# Patient Record
Sex: FEMALE | Race: BLACK OR AFRICAN AMERICAN | URBAN - METROPOLITAN AREA
[De-identification: names, ages, dates, MRNs, and addresses within clinical notes are randomized per-mention and may not be internally consistent; named-entity substitution may affect disease eponyms.]

---

## 2022-02-25 NOTE — PROGRESS NOTES
the past for her chronic back pain. Orders:  -     C-Reactive Protein; Future  -     Sedimentation Rate; Future  -     HLA-B27 Antigen; Future  -     XR LUMBAR SPINE (2-3 VIEWS); Future  -     XR HIP BILATERAL W AP PELVIS (2 VIEWS); Future  3. Fibromyalgia  Assessment & Plan:  - discussed the Dx, pathophysiology and management options of fibromyalgia w/ the pt in detail. Discussed that fibromyalgia is a non-life threatening and non-rheumatic disease. Discussed the various Tx options  including the medical management and PT/aquatic therapy as well as self exercises. Discussed various FDA approved (Cymbalta, Lyrica, Gabapentin, Savella) and non-FDA approved medications (muscle relaxants, SSRI's) w/ the pt. Provided detailed handout on fibromyalgia to the pt. 4. Microcytic anemia  Assessment & Plan:  - pt reported recent onset heavy menstrual bleeding. Following w/ Gynecology. - repeat CBC and check GAVI. ROS negative for CTD. Orders:  -     CBC with Auto Differential; Future  -     GAVI Reflex to Antibody Cascade; Future  -     C3 Complement; Future  -     C4 Complement; Future  5. Encounter for therapeutic drug monitoring  Assessment & Plan:  - repeat renal function for chronic NSAID use. Orders:  -     Creatinine; Future  -     AST; Future  -     ALT; Future     Return in about 6 weeks (around 4/12/2022) for lab result discussion and treatment plan, medication monitoring. 3/1/2022 1:46 PM      The risks and benefits of my recommendations, as well as other treatment options, benefits and side effects were discussed w/ the pt today. Questions were answered. NOTE: This report is transcribed by using voice recognition software dragon. Every effort is made to ensure accuracy; however, inadvertent computerized transcription errors may be present. Consult note will be sent to the referring provider. Thank you very much for allowing me to participate in this pt's care.   Please do not hesitate to contact me if I can be of further assistance. HISTORY OF PRESENT ILLNESS:      Pt reports chronic pain in her neck and lower back since age 18. Back pain is exacerbated by doing housework such as laundry or sweeping and prolonged standing or sitting. Neck and low back feel stiff in the morning, stiffness is exacerbated by moving. Wrists \"pop\" occasionally. Legs and hip make \"a popping noise\" when she walks. No Hx of enthesitis. She reports some pain relief from Ibuprofen, Tramadol and Robaxin prescribed by her pain physician. Pt states she has tried steroid injections to her neck and back which provided temporary pain relief. She tells me she has also tried PT. She reports dry patches of skin otherwise no Hx of PsO. No Hx of uveitis or IBD. Denies known FHx of autoimmune disease. She is a former smoker. 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     REVIEW OF SYSTEMS:    Constitutional: denies chronic fatigue, fever/chills, night sweats, unintentional weight loss  Integumentary: denies photosensitivity, rash, patchy alopecia, or Sx of Raynaud's phenomenon  Eyes: denies dry eyes, redness or pain, visual disturbance, or floaters  Nose: denies nasal ulcers or recurrent sinusitis  Oral cavity: denies dry mouth, or oral ulcers  Cardiovascular: denies CP, palpitations, Hx of pericardial effusion or pericarditis  Respiratory: denies SOB, cough, hemoptysis, or pleurisy  Gastrointestinal: denies heart burn, dysphagia or esophageal dysmotility, denies change in bowel habits or Sx of IBD  Hematologic/Lymphatic: +recent heavy menstrual bleeding, denies abnormal bruising or bleeding, denies Hx of blood clots or recurrent miscarriages, denies swollen LNs  Musculoskeletal:  refer to above HPI   Neurological: denies focal weakness, paresthesias/hyperesthesias or change in sensation, denies Hx of seizure, denies change in gait, balance, or memory  Psychiatric: +well controlled depression and anxiety not currently taking any medicine    No past medical history on file. No past surgical history on file. Social History     Socioeconomic History    Marital status: Not on file     Spouse name: Not on file    Number of children: Not on file    Years of education: Not on file    Highest education level: Not on file   Occupational History    Not on file   Tobacco Use    Smoking status: Former Smoker     Quit date: 2020     Years since quittin.2    Smokeless tobacco: Never Used   Substance and Sexual Activity    Alcohol use: Not on file    Drug use: Not on file    Sexual activity: Not on file   Other Topics Concern    Not on file   Social History Narrative    Not on file     Social Determinants of Health     Financial Resource Strain:     Difficulty of Paying Living Expenses: Not on file   Food Insecurity:     Worried About [address] in the Last Year: Not on file    [address] [place] in the Last Year: Not on file   Transportation Needs:     Lack of Transportation (Medical): Not on file    Lack of Transportation (Non-Medical):  Not on file   Physical Activity:     Days of Exercise per Week: Not on file    Minutes of Exercise per Session: Not on file   Stress:     Feeling of Stress : Not on file   Social Connections:     Frequency of Communication with Friends and Family: Not on file    Frequency of Social Gatherings with Friends and Family: Not on file    Attends Taoist Services: Not on file    Active Member of [address] [place] [address] or Organizations: Not on file    Attends Club or Organization Meetings: Not on file    Marital Status: Not on file   Intimate Partner Violence:     Fear of Current or Ex-Partner: Not on file    Emotionally Abused: Not on file    Physically Abused: Not on file    Sexually Abused: Not on file   Housing Stability:     Unable to Pay for Housing in the Last Year: Not on file    Number of [place] in the Last Year: Not on file    Unstable Housing in the Last Year: Not on file        No family history on file.     MEDICATIONS:    Current Outpatient Medications:     ibuprofen (ADVIL;MOTRIN) 800 MG tablet, TAKE 1 TABLET BY MOUTH EVERY 8 HOURS AS NEEDED FOR 30 DAYS, Disp: , Rfl:     traMADol (ULTRAM) 50 MG tablet, TAKE 1 TABLET BY MOUTH EVERY 12 HOURS AS NEEDED FOR 30 DAYS, Disp: , Rfl:     methocarbamol (ROBAXIN) 750 MG tablet, TAKE 1 TABLET BY MOUTH EVERY 8 HOURS AS NEEDED FOR 30 DAYS, Disp: , Rfl:     estrogens, conjugated, (PREMARIN) 1.25 MG tablet, Take 1.25 mg by mouth daily, Disp: , Rfl:     acetaminophen (TYLENOL) 500 MG tablet, Take 500 mg by mouth every 6 hours as needed, Disp: , Rfl:     medical marijuana, , Disp: , Rfl:     ALLERGIES:  Cat hair extract and Penicillins    PHYSICAL EXAM:    Vitals:    /76   Pulse 77   Ht 4' 11\" (1.499 m)   Wt 210 lb (95.3 kg)   SpO2 99%   BMI 42.41 kg/m²     GEN: AAOx3, in NAD, well-appearing, elevated BMI  HEAD: normocephalic, atraumatic  EYES: no injection or icterus  NOSE: no nasal ulcers or nasal drainage  ORAL CAVITY: moist oral mucosa w/ good saliva pooling, no oral lesions, no evidence of thrush, no evidence of parotid gland enlargement  CVS: RRR  LUNGS: in no acute respiratory distress, CTAB  MSK: there is diffuse myofascial tenderness w/ 18/18 tender points  Spine: no kyphosis or lordosis, +myofascial tenderness marco in the trapezius and lumba regions,  SI joints TTP  Upper extremities:   Hands: no synovitis in the MCP, PIP, or DIP joints b/l, no dactylitis, able to make strong full fists, no evidence of sclerodactyly, calcinosis, digital ulcers or pitting   Wrist: no synovitis in the wrist joints b/l, FROM   Elbow: no synovitis or bursitis, FROM  Lower extremities:   Knees: no warmth or effusion present, FROM   Ankles: no synovitis, FROM, Achilles tendons w/o swelling or warmth NTTP   Feet: no toe swelling or pain or warmth on palpation w/ FROM, negative MTP squeeze test  INTEGUMENT: no rash or psoriatic lesions, no petechiae, bruises, or palpable purpura, no patchy alopecia, no nail pitting or periungual changes, no clubbing or digital ulcers    Labs: I personally reviewed prior labs including:    CBC w/ diff (6/17/21): WBC wnl, H/H 10.2/31.9, MCV 79.6, plt count 290  Scr 0.9, GFR 87 (6/17/21)    Radiology:  I personally reviewed prior imaging including:    MRI C-spine (10/28/20): FINDINGS:   MRI CERVICAL SPINE:   The cervical lordosis is straightened. The vertebral body heights are normal. The marrow signal intensity is unremarkable. The atlantoaxial joint is within normal limits. Small marginal endplate osteophytes are seen. The cervical spinal cord is normal in size and signal intensity. No paravertebral soft tissue abnormality is seen. C2-3: The disc height is normal. No significant disc bulge or central canal or foraminal stenosis is seen. C3-4: The disc height is normal. No significant disc bulge or central canal or foraminal stenosis is seen. C4-5: The disc height is normal. Mild bulging of the posterior annulus is seen. No significant central canal or foraminal stenosis is seen. C5-6: The disc height is normal. Minimal bulging of the posterior annulus is seen. No significant central canal or foraminal stenosis is seen. C6-7: The disc height is normal. Minimal bulging of the posterior annulus is seen. No significant central canal or foraminal stenosis is seen. C7-T1: The disc height is normal. No significant disc bulge or central canal or foraminal stenosis is seen. IMPRESSION:   1. Minimal degenerative spondylosis of of the cervical spine. 2.  Straightening of the cervical lordosis. 3.  Otherwise, the remainder of the examination is unremarkable. MRI L-spine (10/27/20):  MRI  LUMBAR SPINE:   The lumbar lordosis is maintained. The distal cord and conus are normal in appearance without demyelination, infiltration, contusion or syrinx and terminates at T12-L1. The vertebral body heights are preserved.  No pars defects are present. The marrow signal intensity is unremarkable. T12-L1: The disc is normal in height is normal. No significant disc bulge or protrusion is seen. No significant central canal or foraminal stenosis is seen. L1-L2: The disc is normal in height is normal. No significant disc bulge or protrusion is seen. No significant central canal or foraminal stenosis is seen. L2-L3: The disc is normal in height is normal. No significant disc bulge or protrusion is seen. No significant central canal or foraminal stenosis is seen. L3-L4: The disc is normal in height is normal. No significant disc bulge or protrusion is seen. No significant central canal or foraminal stenosis is seen. L4-L5: The disc is normal in height is normal. No significant disc bulge or protrusion is seen. No significant central canal or foraminal stenosis is seen. L5-S1: The disc is normal in height is normal. No significant disc bulge or protrusion is seen. No significant central canal or foraminal stenosis is seen. Mild soft tissue swelling in the posterior subcutaneous tissues is seen from L2 to L4. No other paravertebral soft tissue abnormality is seen. IMPRESSION:   1. Mild soft tissue swelling in the posterior subcutaneous tissues. 2. Otherwise, unremarkable MRI of the Lumbar Spine. Above results were discussed w/ the pt in detail during today's visit.

## 2022-03-01 ENCOUNTER — HOSPITAL ENCOUNTER (OUTPATIENT)
Dept: GENERAL RADIOLOGY | Age: 32
Discharge: HOME | End: 2022-03-01
Payer: COMMERCIAL

## 2022-03-01 ENCOUNTER — OFFICE VISIT (OUTPATIENT)
Dept: RHEUMATOLOGY | Age: 32
End: 2022-03-01
Payer: COMMERCIAL

## 2022-03-01 VITALS
OXYGEN SATURATION: 99 % | HEART RATE: 77 BPM | BODY MASS INDEX: 42.33 KG/M2 | HEIGHT: 59 IN | WEIGHT: 210 LBS | SYSTOLIC BLOOD PRESSURE: 122 MMHG | DIASTOLIC BLOOD PRESSURE: 76 MMHG

## 2022-03-01 DIAGNOSIS — M25.50 POLYARTHRALGIA: ICD-10-CM

## 2022-03-01 DIAGNOSIS — M54.2 CHRONIC NECK AND BACK PAIN: ICD-10-CM

## 2022-03-01 DIAGNOSIS — G89.29 CHRONIC NECK AND BACK PAIN: ICD-10-CM

## 2022-03-01 DIAGNOSIS — M25.50 POLYARTHRALGIA: Primary | ICD-10-CM

## 2022-03-01 DIAGNOSIS — Z51.81 ENCOUNTER FOR THERAPEUTIC DRUG MONITORING: ICD-10-CM

## 2022-03-01 DIAGNOSIS — D50.9 MICROCYTIC ANEMIA: ICD-10-CM

## 2022-03-01 DIAGNOSIS — M54.9 CHRONIC NECK AND BACK PAIN: ICD-10-CM

## 2022-03-01 DIAGNOSIS — M79.7 FIBROMYALGIA: ICD-10-CM

## 2022-03-01 LAB
ALT SERPL-CCNC: 15 U/L (ref 10–40)
AST SERPL-CCNC: 18 U/L (ref 15–37)
BASOPHILS ABSOLUTE: 0 K/UL (ref 0–0.2)
BASOPHILS RELATIVE PERCENT: 0.4 %
C-REACTIVE PROTEIN: 14.8 MG/L (ref 0–5.1)
C3 COMPLEMENT: 146.6 MG/DL (ref 90–180)
C4 COMPLEMENT: 30.4 MG/DL (ref 10–40)
CREAT SERPL-MCNC: 0.6 MG/DL (ref 0.6–1.1)
EOSINOPHILS ABSOLUTE: 0.1 K/UL (ref 0–0.6)
EOSINOPHILS RELATIVE PERCENT: 1.8 %
GFR AFRICAN AMERICAN: >60
GFR NON-AFRICAN AMERICAN: >60
HCT VFR BLD CALC: 38.1 % (ref 36–48)
HEMOGLOBIN: 12.5 G/DL (ref 12–16)
HEPATITIS B SURFACE ANTIGEN INTERPRETATION: NORMAL
HEPATITIS C ANTIBODY INTERPRETATION: NORMAL
LYMPHOCYTES ABSOLUTE: 1.4 K/UL (ref 1–5.1)
LYMPHOCYTES RELATIVE PERCENT: 21.8 %
MCH RBC QN AUTO: 27.9 PG (ref 26–34)
MCHC RBC AUTO-ENTMCNC: 32.7 G/DL (ref 31–36)
MCV RBC AUTO: 85.4 FL (ref 80–100)
MONOCYTES ABSOLUTE: 0.4 K/UL (ref 0–1.3)
MONOCYTES RELATIVE PERCENT: 6.9 %
NEUTROPHILS ABSOLUTE: 4.3 K/UL (ref 1.7–7.7)
NEUTROPHILS RELATIVE PERCENT: 69.1 %
PDW BLD-RTO: 14 % (ref 12.4–15.4)
PLATELET # BLD: 247 K/UL (ref 135–450)
PMV BLD AUTO: 8.6 FL (ref 5–10.5)
RBC # BLD: 4.46 M/UL (ref 4–5.2)
RHEUMATOID FACTOR: <10 IU/ML
SEDIMENTATION RATE, ERYTHROCYTE: 51 MM/HR (ref 0–20)
URIC ACID, SERUM: 3.6 MG/DL (ref 2.6–6)
VITAMIN D 25-HYDROXY: 11.9 NG/ML
WBC # BLD: 6.2 K/UL (ref 4–11)

## 2022-03-01 PROCEDURE — 73130 X-RAY EXAM OF HAND: CPT

## 2022-03-01 PROCEDURE — 72100 X-RAY EXAM L-S SPINE 2/3 VWS: CPT

## 2022-03-01 PROCEDURE — 73521 X-RAY EXAM HIPS BI 2 VIEWS: CPT

## 2022-03-01 PROCEDURE — 99244 OFF/OP CNSLTJ NEW/EST MOD 40: CPT | Performed by: INTERNAL MEDICINE

## 2022-03-01 RX ORDER — IBUPROFEN 800 MG/1
TABLET ORAL
COMMUNITY
Start: 2022-02-10

## 2022-03-01 RX ORDER — METHOCARBAMOL 750 MG/1
TABLET, FILM COATED ORAL
COMMUNITY
Start: 2022-02-10

## 2022-03-01 RX ORDER — ACETAMINOPHEN 500 MG
500 TABLET ORAL EVERY 6 HOURS PRN
COMMUNITY

## 2022-03-01 RX ORDER — TRAMADOL HYDROCHLORIDE 50 MG/1
TABLET ORAL
COMMUNITY
Start: 2022-02-10

## 2022-03-01 NOTE — ASSESSMENT & PLAN NOTE
- pt reported recent onset heavy menstrual bleeding. Following w/ Gynecology. - repeat CBC and check GAVI. ROS negative for CTD.

## 2022-03-01 NOTE — PATIENT INSTRUCTIONS
After your visit with Dr. Mendoza Verma today, please obtain all labs and imaging as ordered prior to your 4-6 week follow up visit. Please make sure to obtain all X-rays at a [facility]) facility as Dr. Mendoza Verma personally reviews the films to make the proper diagnosis for you. If you are referred to another doctor, make sure to call the provided number to schedule an appointment for yourself. If you dont hear anything from that doctors office after a few business days, please give our office a call so we help you or reach out to them on your own if you can find their contact information    Please note:   Lab and imaging results will be discussed at follow up appointments (not over the phone or via [address]). If you would like a copy of your labs before your follow up appointment, you can call the office and request for them to be mailed to you. Take pictures on your phone! Many manifestations of rheumatic disease are transient. If you take a picture of your bothersome physical finding (rash, swollen joint, ulcer, etc), we will have more information at your next appointment. Prescriptions and refills will be handled at scheduled office visits and enough medicine will be prescribed to last at least until the patients next appointment. Since we expect to fill prescriptions at the office visit, running out may be a signal that it is time to call our office to schedule an appointment. 90-day refills will be provided at [address] [place] [address] as most of our medications are high risk medications that require toxicity monitoring. It is your responsibility to schedule your follow up appointment on time to ensure that you have enough refills in between office visits. Patients with rheumatic disease are more susceptible to a variety of infections, whether or not they are on medicine to suppress their immune system.  Please discuss vaccinations with your primary care doctor,  including but not limited to:  o Influenza vaccination (yearly)  o Pneumococcal vaccinations (Prevnar 13, Pneumovax)  o Shingles vaccination (Shingrix)  o HPV vaccines (SLE patients have a higher rate of HPV infection and precancerous cervical lesions)  o TDaP vaccination  o Hepatitis B vaccination    Patients with rheumatic disease are at a higher risk for a variety of cancers. Please make sure you discuss age appropriate screening with your primary care doctor, including but not limited to:  o Breast, cervical, colon, and prostate cancer screenings  o Yearly full body skin exams with a dermatologist    Knowledge is power. There is a wealth of free patient information available on www. [email or web address]. Some articles require a subscription, so if you are unable to access an article, please let Dr. Michelle Joel know and she can print it or e-mail the article to you. We encourage you to read as much as you can about your diagnosis and the recommended medications. Fibromyalgia           Fast Facts  Fibromyalgia affects two to four percent of people, women more often than men. Doctors diagnose fibromyalgia based on all the patients relevant symptoms (what you feel), no longer just on the number of tender places during an examination. There is no test to detect this disease, but you may need lab tests or X-rays to rule out other health problems. Though there is no cure, medications can reduce symptoms in some patients. Patients also may feel better with proper self-care, such as exercise and getting enough sleep. Fibromyalgia is a common health problem that causes widespread pain and tenderness (sensitivity to touch). The pain and tenderness tend to come and go, and move about the body. Most often, people with this chronic (long-term) illness are fatigued (very tired) and have sleep problems. The diagnosis can be made with a careful examination. Fibromyalgia is most common in women, though it can occur in men.  It most often starts in middle adulthood, but can occur in the teen years and in old age. You are at higher risk for fibromyalgia if you have a rheumatic disease (health problem that affects the joints, muscles and bones). These include osteoarthritis, lupus, rheumatoid arthritis or ankylosing spondylitis. What is fibromyalgia? Fibromyalgia is a chronic health problem that causes pain all over the body and other symptoms. Other symptoms of fibromyalgia that patients most often have are:  Tenderness to touch or pressure affecting muscles and sometimes joints or even the skin   Severe fatigue   Sleep problems (waking up unrefreshed)   Problems with memory or thinking clearly  Some patients also may have:  Depression or anxiety   Migraine or tension headaches   Digestive problems: irritable bowel syndrome (commonly called IBS) or gastroesophageal reflux disease (often referred to as GERD)   Irritable or overactive bladder   Pelvic pain   Temporomandibular disorderoften called TMJ (a set of symptoms including face or jaw pain, jaw clicking and ringing in the ears)  Symptoms of fibromyalgia and its related problems can vary in intensity, and will wax and wane over time. Stress often worsens the symptoms. What causes fibromyalgia? The causes of fibromyalgia are unclear. They may be different in different people. Fibromyalgia may run in families. There likely are certain genes that can make people more prone to getting fibromyalgia and the other health problems that can occur with it. Genes alone, though, do not cause fibromyalgia. There is most often some triggering factor that sets off fibromyalgia. It may be spine problems, arthritis, injury, or other type of physical stress. Emotional stress also may trigger this illness. The result is a change in the way the body talks with the spinal cord and brain. Levels of brain chemicals and proteins may change.  For the person with fibromyalgia, it is as though the volume control is turned up too high in the brain's pain processing centers. How is fibromyalgia diagnosed? A doctor will suspect fibromyalgia based on your symptoms. Doctors may require that you have tenderness to pressure or tender points at a specific number of certain spots before saying you have fibromyalgia, but they are not required to make the diagnosis (see the Box). A physical exam can be helpful to detect tenderness and to exclude other causes of muscle pain. There are no diagnostic tests (such as X-rays or blood tests) for this problem. Yet, you may need tests to rule out another health problem that can be confused with fibromyalgia. Because widespread body pain is the main feature of fibromyalgia, health care providers will ask you to describe your pain. This may help tell the difference between fibromyalgia and other diseases with similar symptoms. Other conditions such as hypothyroidism (underactive thyroid gland) and polymyalgia rheumatica sometimes mimic fibromyalgia. Blood tests can tell if you have either of these problems. Sometimes, fibromyalgia is confused with rheumatoid arthritis or lupus. But, again, there is a difference in the symptoms, physical findings and blood tests that will help your health care provider detect these health problems. Unlike fibromyalgia, these rheumatic diseases cause inflammation in the joints and tissues. Criteria Needed for a Fibromyalgia Diagnosis       1. Pain and symptoms over the past week, based on the total of: Number of painful areas out of 19 parts of the body Plus level of severity of these symptoms: a. Fatigue b. Waking unrefreshed c. Cognitive (memory or thought) problems Plus number of other general physical symptoms    2. Symptoms lasting at least three months at a similar level   3. No other health problem that would explain the pain and other          How is fibromyalgia treated? There is no cure for fibromyalgia.  However, symptoms can be treated with both medication and non-drug treatments. Many times the best outcomes are achieved by using multiple types of treatments. Medications: The U.S. Food and Drug Administration has approved three drugs for the treatment of fibromyalgia. They include two drugs that change some of the brain chemicals (serotonin and norepinephrine) that help control pain levels: duloxetine (Cymbalta) and milnacipran (Savella). Older drugs that affect these same brain chemicals also may be used to treat fibromyalgia. These include amitriptyline (Elavil) and cyclobenzaprine (Flexeril). Other antidepressant drugs can be helpful in some patients. Side effects vary by the drug. Ask your doctor about the risks and benefits of your medicine. The other drug approved for fibromyalgia is pregabalin (Lyrica). Pregabalin and another drug, gabapentin (Neurontin), work by blocking the over activity of nerve cells involved in pain transmission. These medicines may cause dizziness, sleepiness, swelling and weight gain. Doctors do not recommend opioid narcotics for treating fibromyalgia. The reason for this is that research evidence suggests these drugs are not of great benefit to most people with fibromyalgia. In fact, they may cause greater pain sensitivity or make pain persist. Tramadol (Ultram) may be used to treat fibromyalgia pain if short-term use of an opioid narcotic is needed. Over-the-counter medicines such as acetaminophen (Tylenol) or nonsteroidal anti-inflammatory drugs (commonly called NSAIDs) like ibuprofen (Advil, Motrin) or naproxen (Aleve, Anaprox) are not effective for fibromyalgia pain. Yet, these drugs may be useful to treat the pain triggers of fibromyalgia. Thus, they are most useful in people who have other causes for pain such as arthritis in addition to fibromyalgia. For sleep problems, some of the medicines that treat pain also improve sleep.  These include cyclobenzaprine (Flexeril), amitriptyline (Elavil), gabapentin (Neurontin) or pregabalin (Lyrica). It is not recommended that patients with fibromyalgia take sleeping medicines like zolpidem (Ambien) or benzodiazepine medications. Other Therapies: People with fibromyalgia should use non-drug treatments as well as any medicines their doctors suggest. Research shows that the most effective treatment for fibromyalgia is physical exercise. Physical exercise should be used in addition to any drug treatment. Patients benefit most from aerobic exercises. Other body-based therapies including Ryan Chi and yoga can ease fibromyalgia symptoms. Cognitive behavioral therapy is a type of therapy focused on understanding how thoughts and behaviors affect pain and other symptoms. CBT and related treatments such as mindfulness can help patients learn symptom reduction skills that lessen pain. Other complementary and alternative therapies (sometimes called CAM or integrative medicine), such as acupuncture, chiropractic and massage therapy, can be useful to manage fibromyalgia symptoms. Many of these treatments, though, have not been well tested in patients with fibromyalgia. Living with fibromyalgia  Even with the many treatment options, patient self-care is vital to improving symptoms and daily function. In concert with medical treatment, healthy lifestyle behaviors can reduce pain, increase sleep quality, lessen fatigue and help you cope better with fibromyalgia. With proper treatment and self-care, you can get better and live a more normal life. Here are some self-care tips for living with fibromyalgia:  Make time to relax each day. Deep-breathing exercises and meditation will help reduce the stress that can bring on symptoms. Set a regular sleep pattern. Go to bed and wake up at the same time each day. Getting enough sleep lets your body repair itself, physically and mentally. Also, avoid daytime napping and limit caffeine intake, which can disrupt sleep.  Nicotine is a stimulant, so those fibromyalgia patients with sleep problems should stop smoking. Exercise often. This is a very important part of fibromyalgia treatment. While difficult at first, regular exercise often reduces pain symptoms and fatigue. Patients should follow the saying, Start low, go slow.  Slowly add daily fitness into your routine. For instance, take the stairs instead of the elevator, or park further away from the store. As your symptoms decrease with drug treatments, start increasing your activity. Add in some walking, swimming, water aerobics and/or stretching exercises, and begin to do things that you stopped doing because of your pain and other symptoms. It takes time to create a comfortable routine. Just get moving, stay active and don't give up! Educate yourself. Nationally recognized organizations like the [facility] are great resources for information. Share this information with family, friends and co-workers. Look forward, not backward. Focus on what you need to do to get better, not what caused your illness. The role of the rheumatologist  Fibromyalgia is not a form of arthritis (joint disease). It does not cause inflammation or damage to joints, muscles or other tissues. However, because fibromyalgia can cause chronic pain and fatigue similar to arthritis, some people may think of it as a rheumatic condition. As a result, often a rheumatologist detects this disease (and rules out other rheumatic diseases). Your primary care physician can provide all the other care and treatment of fibromyalgia that you need. Additional Information  The [employer] of Rheumatology has compiled this list to give you a starting point for your own additional research. The ACR does not endorse or maintain these Web sites, and is not responsible for any information or claims provided on them.  It is always best to talk with your rheumatologist for more information and before making any decisions about your care. [address] of Arthritis and Musculoskeletal and Skin Diseases   National Fibromyalgia Association   National Fibromyalgia and Chronic Pain [address] [facility].  Updated May 2015. Written by Miller Loomis MD, and reviewed by the [facility] of Rheumatology Committee on Communications and Marketing. This information is provided for general education only. Individuals should consult a qualified health care provider for professional medical advice, diagnosis and treatment of a medical or health condition. © 2015 [facility] of Rheumatology.

## 2022-03-01 NOTE — ASSESSMENT & PLAN NOTE
- non-inflammatory chronic neck and low back pain. Reviewed prior MRI studies from 10/2020 which showed minimal degenerative arthritis in the cervical spine and nonspecific soft tissue swelling in the posterior subcutaneous tissues from L2-L4.  - check X-ray of L-spine and hips/SI joints to r/o seronegative SpA, I have low clinical suspicion. - cont cont Ibuprofen PRN, Methocarbamol and Tramadol per Pain Management (following w/ Dr. Maggie Canavan). Pt stated she has tried steroid injections and PT in the past for her chronic back pain.

## 2022-03-01 NOTE — ASSESSMENT & PLAN NOTE
- pt reported intermittent \"popping\" of her wrists and knees. Clinical Hx of not c/w inflammatory pain. ROS negative for CTD. No evidence of synovitis appreciated in the peripheral joints on exam today. - obtain rheum w/u to r/o inflammatory arthritis. - cont Ibuprofen PRN per Pain Management.

## 2022-03-01 NOTE — LETTER
[facility]  [address] [address]  Phone: [phone]  Fax: [phone]           Krysta Saxena MD      March 1, 2022     Patient: Hyacinth Espinal   MR Number: <[id number]>   YOB: 1990   Date of Visit: 3/1/2022       Dear Dr. Ilana Garvey: Thank you for referring Hyacinth Espinal to me for evaluation/treatment. Below are the relevant portions of my assessment and plan of care. If you have questions, please do not hesitate to call me. I look forward to following [facility] along with you.     Sincerely,        Krysta Saxena MD     providers:  [facility] [place], [address] [place] [address]  [address] [address]  Via Fax: [phone]

## 2022-03-01 NOTE — ASSESSMENT & PLAN NOTE
- discussed the Dx, pathophysiology and management options of fibromyalgia w/ the pt in detail. Discussed that fibromyalgia is a non-life threatening and non-rheumatic disease. Discussed the various Tx options  including the medical management and PT/aquatic therapy as well as self exercises. Discussed various FDA approved (Cymbalta, Lyrica, Gabapentin, Savella) and non-FDA approved medications (muscle relaxants, SSRI's) w/ the pt. Provided detailed handout on fibromyalgia to the pt.

## 2022-03-02 DIAGNOSIS — E55.9 VITAMIN D DEFICIENCY: Primary | ICD-10-CM

## 2022-03-02 LAB
ANTI-NUCLEAR ANTIBODY (ANA): NEGATIVE
CYCLIC CITRULLINATED PEPTIDE ANTIBODY IGG: <0.5 U/ML (ref 0–2.9)

## 2022-03-02 NOTE — RESULT ENCOUNTER NOTE
Vitamin D level is insufficient, this can cause fatigue and bone pains. Sent weekly supplement to pharmacy, she can start now. Will discuss the rest of her w/u at f/u visit.

## 2022-03-03 LAB — HEPATITIS B CORE TOTAL ANTIBODY: NEGATIVE

## 2022-03-04 LAB — HLA B27: NEGATIVE
